# Patient Record
Sex: FEMALE | Race: BLACK OR AFRICAN AMERICAN | NOT HISPANIC OR LATINO | Employment: PART TIME | URBAN - METROPOLITAN AREA
[De-identification: names, ages, dates, MRNs, and addresses within clinical notes are randomized per-mention and may not be internally consistent; named-entity substitution may affect disease eponyms.]

---

## 2022-02-02 ENCOUNTER — APPOINTMENT (OUTPATIENT)
Dept: ULTRASOUND IMAGING | Facility: CLINIC | Age: 32
End: 2022-02-02
Attending: EMERGENCY MEDICINE
Payer: COMMERCIAL

## 2022-02-02 ENCOUNTER — HOSPITAL ENCOUNTER (EMERGENCY)
Facility: CLINIC | Age: 32
Discharge: HOME | End: 2022-02-02
Attending: EMERGENCY MEDICINE | Admitting: EMERGENCY MEDICINE
Payer: COMMERCIAL

## 2022-02-02 ENCOUNTER — APPOINTMENT (OUTPATIENT)
Dept: GENERAL RADIOLOGY | Facility: CLINIC | Age: 32
End: 2022-02-02
Attending: EMERGENCY MEDICINE
Payer: COMMERCIAL

## 2022-02-02 VITALS
DIASTOLIC BLOOD PRESSURE: 78 MMHG | RESPIRATION RATE: 20 BRPM | BODY MASS INDEX: 29.62 KG/M2 | HEART RATE: 103 BPM | SYSTOLIC BLOOD PRESSURE: 128 MMHG | OXYGEN SATURATION: 100 % | HEIGHT: 69 IN | WEIGHT: 200 LBS | TEMPERATURE: 98.8 F

## 2022-02-02 DIAGNOSIS — R22.41 LOCALIZED SWELLING OF RIGHT LOWER LEG: ICD-10-CM

## 2022-02-02 DIAGNOSIS — M25.471 RIGHT ANKLE SWELLING: Primary | ICD-10-CM

## 2022-02-02 PROCEDURE — 99285 EMERGENCY DEPT VISIT HI MDM: CPT | Mod: 25

## 2022-02-02 PROCEDURE — 99284 EMERGENCY DEPT VISIT MOD MDM: CPT | Mod: 25

## 2022-02-02 PROCEDURE — 93971 EXTREMITY STUDY: CPT | Mod: RT

## 2022-02-02 PROCEDURE — 73610 X-RAY EXAM OF ANKLE: CPT | Mod: RT

## 2022-02-02 RX ORDER — IBUPROFEN 600 MG/1
600 TABLET, FILM COATED ORAL EVERY 6 HOURS PRN
Qty: 60 TABLET | Refills: 0 | Status: SHIPPED | OUTPATIENT
Start: 2022-02-02

## 2022-02-02 ASSESSMENT — ENCOUNTER SYMPTOMS
FEVER: 0
ABDOMINAL PAIN: 0
SHORTNESS OF BREATH: 0

## 2022-02-02 ASSESSMENT — MIFFLIN-ST. JEOR: SCORE: 1676.57

## 2022-02-03 NOTE — ED TRIAGE NOTES
Pt presents for evaluation of right ankle pain and swelling since last Wednesday. Denies any injury. Denies any drainage. Pain with ambulation. Was seen at [facility] on Friday for same issue, was given antibiotics, but no improvement. Took tylenol around 1700.

## 2022-02-03 NOTE — ED PROVIDER NOTES
"  History   Chief Complaint:  Ankle Pain     HPI   Loren Gonzalez is a 32 year old female with history of prediabetes who presents with ankle pain. A week and a half ago, the patient developed pain and swelling over the lateral aspect of the right ankle. She developed additional swelling up her right leg. She did not experience any trauma around this time. Six days ago, she was seen for this pain and was discharged with Keflex following a diagnosis of cellulitis. She has been taking the Keflex as prescribed and Tylenol for pain. Since then her symptoms have been persistent. Due to her continued symptoms she presented here. She denies any chest pain, shortness of breath, abdominal pain, fever or right calf or knee pain.     Review of Systems   Constitutional: Negative for fever.   Respiratory: Negative for shortness of breath.    Cardiovascular: Positive for leg swelling. Negative for chest pain.   Gastrointestinal: Negative for abdominal pain.   Musculoskeletal:        Right ankle swelling and pain  No right calf pain  No right knee pain   All other systems reviewed and are negative.    Allergies:  The patient has no known allergies.     Medications:  Keflex    Past Medical History:    Prediabetes  Female infertility  LGSIL  Dysfunctional uterine hemorrhage  Acute blood loss anemia  Hypotension due to blood loss  Tension type headache  Vitamin D deficiency    Surgery History:  Female circumcision    Social History:  The patient was unaccompanied to the ED.    Physical Exam     Patient Vitals for the past 24 hrs:   BP Temp Temp src Pulse Resp SpO2 Height Weight   02/02/22 2043 128/78 98.8  F (37.1  C) Oral 103 20 100 % 1.753 m (5' 9\") 90.7 kg (200 lb)     Physical Exam  General: Alert, appears well-developed and well-nourished. Cooperative.     In mild distress  HEENT:  Head:  Atraumatic  Ears:  External ears are normal  Mouth/Throat:  Oropharynx is without erythema or exudate and mucous membranes are moist. "   Eyes:   Conjunctivae normal and EOM are normal. No scleral icterus.  CV:  Normal rate, regular rhythm, normal heart sounds and radial pulses are 2+ and symmetric.  Systolic murmur.  Resp:  Breath sounds are clear bilaterally    Non-labored, no retractions or accessory muscle use  GI:  Abdomen is soft, no distension, no tenderness. No rebound or guarding.  No CVA tenderness bilaterally  MS:  Normal range of motion. No edema.    Back atraumatic.    No midline cervical, thoracic, or lumbar tenderness    Left Leg:    Thigh:     Normal    Knee:     Normal; there is no effusion    Proximal fibula:   Normal and non-tender    Tibia:     Long shaft of the tibia is normal    Zoila-lateral malleolar ligaments: Swollen and tender    Medial collateral (deltoid) ligament: Normal    Lateral malleolus:   Mildly-tender    Medial Malleolus:   Non-tender      Achilles tendon:   Normal, intact    5th MT base:    Normal and non-tender    Foot bones:    Normal and non-tender       Capillary Refill:   Normal.    Sensation:    Foot and ankle sensation are normal    There is 1+ pitting edema to right lower extremity, distal to right knee.   Skin:  No rash or lesions noted, swelling is notes as above  Neuro: Speech is normal and fluent  Psych:  Awake. Alert.  Normal affect.  Appropriate interactions.    Emergency Department Course   Imaging:  XR Ankle Right G/E 3 Views   Final Result   IMPRESSION: No fracture or dislocation. No focal bone lesion. Lateral soft tissue swelling.      US Lower Extremity Venous Duplex Right   Final Result   IMPRESSION:   1.  No deep venous thrombosis in the right lower extremity.        Emergency Department Course:     Reviewed:  I reviewed nursing notes, vitals, past medical history and care everywhere    Assessments:  2051 I obtained history and examined the patient as noted above.     2228 I rechecked and updated the patient regarding the imaging results and the plan for care.    Disposition:  The patient was  discharged to home.     Impression & Plan   Medical Decision Making:  Loren Gonzalez is a 32 year old female who presents for evaluation of right leg and ankle swelling. A broad differential was considered including Baker's cyst rupture, Baker's cyst, hematoma, rupture, compartment syndrome, muscle rupture, DVT, superficial thrombophlebitis, compression of the venous structures higher up in the abdomen and or leg cellulitis/ abscess, etc. Ultrasound is negative. There are no signs of compartment syndrome or other worrisome etiologies at this point so outpatient management is indicated.  I doubt more central causes of their swelling like renal failure, chf, liver disease, etc. There is no erythema and patient recently completed a 7 day course of antibiotics for possible cellulitis, low concern for cellulitis or infectious process.  No fluctuance to swelling.  The swelling does not appear to be within the right ankle joint either, low concern for septic arthritis. They will elevate leg, do gentle dorsal flexion and plantar flexion motions, take Tylenol for pain, and avoid strenuous activity. They should followup with her primary care doctor and/or orthopedic surgery for re-evaluation due to chronic swelling of extremity.  Written copy of imaging data given directly to patient and questions answered.  Discharged home.    Diagnosis:    ICD-10-CM    1. Right ankle swelling  M25.471    2. Localized swelling of right lower leg  R22.41        Discharge Medications:  New Prescriptions    IBUPROFEN (ADVIL/MOTRIN) 600 MG TABLET    Take 1 tablet (600 mg) by mouth every 6 hours as needed for moderate pain       Scribe Disclosure:  I, Bulmaro Srinivasan, am serving as a scribe at 10:32 PM on 2/2/2022 to document services personally performed by Henry Fishman MD based on my observations and the provider's statements to me.     Henry Fishman MD  02/02/22 5875

## 2022-02-03 NOTE — ED NOTES
Musculoskeletal - RLE Weight-Bearing Status: weight-bearing as tolerated  CMS Intact: Yes  Musculoskeletal Comment: pt comes in with RLE swelling, pain and discoloration for last week. right leg is visiblly more swoolen then left.

## 2023-02-09 ENCOUNTER — HOSPITAL ENCOUNTER (EMERGENCY)
Facility: CLINIC | Age: 33
Discharge: HOME | End: 2023-02-10
Attending: EMERGENCY MEDICINE | Admitting: EMERGENCY MEDICINE
Payer: COMMERCIAL

## 2023-02-09 ENCOUNTER — APPOINTMENT (OUTPATIENT)
Dept: CT IMAGING | Facility: CLINIC | Age: 33
End: 2023-02-09
Attending: EMERGENCY MEDICINE
Payer: COMMERCIAL

## 2023-02-09 ENCOUNTER — APPOINTMENT (OUTPATIENT)
Dept: ULTRASOUND IMAGING | Facility: CLINIC | Age: 33
End: 2023-02-09
Attending: EMERGENCY MEDICINE
Payer: COMMERCIAL

## 2023-02-09 DIAGNOSIS — R11.2 NAUSEA AND VOMITING, UNSPECIFIED VOMITING TYPE: ICD-10-CM

## 2023-02-09 DIAGNOSIS — R10.11 ABDOMINAL PAIN, RIGHT UPPER QUADRANT: ICD-10-CM

## 2023-02-09 DIAGNOSIS — N13.4 HYDROURETER ON RIGHT: ICD-10-CM

## 2023-02-09 LAB
ALBUMIN SERPL BCG-MCNC: 4.3 G/DL (ref 3.5–5.2)
ALP SERPL-CCNC: 49 U/L (ref 35–104)
ALT SERPL W P-5'-P-CCNC: 14 U/L (ref 10–35)
ANION GAP SERPL CALCULATED.3IONS-SCNC: 11 MMOL/L (ref 7–15)
AST SERPL W P-5'-P-CCNC: 15 U/L (ref 10–35)
BASOPHILS # BLD AUTO: 0.1 10E3/UL (ref 0–0.2)
BASOPHILS NFR BLD AUTO: 1 %
BILIRUB SERPL-MCNC: 0.3 MG/DL
BUN SERPL-MCNC: 5.4 MG/DL (ref 6–20)
CALCIUM SERPL-MCNC: 9.6 MG/DL (ref 8.6–10)
CHLORIDE SERPL-SCNC: 103 MMOL/L (ref 98–107)
CREAT SERPL-MCNC: 0.49 MG/DL (ref 0.51–0.95)
DEPRECATED HCO3 PLAS-SCNC: 23 MMOL/L (ref 22–29)
EOSINOPHIL # BLD AUTO: 0.2 10E3/UL (ref 0–0.7)
EOSINOPHIL NFR BLD AUTO: 4 %
ERYTHROCYTE [DISTWIDTH] IN BLOOD BY AUTOMATED COUNT: 13.3 % (ref 10–15)
GFR SERPL CREATININE-BSD FRML MDRD: >90 ML/MIN/1.73M2
GLUCOSE SERPL-MCNC: 85 MG/DL (ref 70–99)
HCG SERPL QL: NEGATIVE
HCT VFR BLD AUTO: 40.5 % (ref 35–47)
HGB BLD-MCNC: 13.1 G/DL (ref 11.7–15.7)
HOLD SPECIMEN: NORMAL
IMM GRANULOCYTES # BLD: 0 10E3/UL
IMM GRANULOCYTES NFR BLD: 0 %
LIPASE SERPL-CCNC: 25 U/L (ref 13–60)
LYMPHOCYTES # BLD AUTO: 2.4 10E3/UL (ref 0.8–5.3)
LYMPHOCYTES NFR BLD AUTO: 42 %
MCH RBC QN AUTO: 28.3 PG (ref 26.5–33)
MCHC RBC AUTO-ENTMCNC: 32.3 G/DL (ref 31.5–36.5)
MCV RBC AUTO: 88 FL (ref 78–100)
MONOCYTES # BLD AUTO: 0.5 10E3/UL (ref 0–1.3)
MONOCYTES NFR BLD AUTO: 9 %
NEUTROPHILS # BLD AUTO: 2.5 10E3/UL (ref 1.6–8.3)
NEUTROPHILS NFR BLD AUTO: 44 %
NRBC # BLD AUTO: 0 10E3/UL
NRBC BLD AUTO-RTO: 0 /100
PLATELET # BLD AUTO: 257 10E3/UL (ref 150–450)
POTASSIUM SERPL-SCNC: 3.6 MMOL/L (ref 3.4–5.3)
PROT SERPL-MCNC: 6.9 G/DL (ref 6.4–8.3)
RBC # BLD AUTO: 4.63 10E6/UL (ref 3.8–5.2)
SODIUM SERPL-SCNC: 137 MMOL/L (ref 136–145)
WBC # BLD AUTO: 5.8 10E3/UL (ref 4–11)

## 2023-02-09 PROCEDURE — 84703 CHORIONIC GONADOTROPIN ASSAY: CPT | Performed by: EMERGENCY MEDICINE

## 2023-02-09 PROCEDURE — 250N000011 HC RX IP 250 OP 636: Performed by: EMERGENCY MEDICINE

## 2023-02-09 PROCEDURE — 76705 ECHO EXAM OF ABDOMEN: CPT

## 2023-02-09 PROCEDURE — 99285 EMERGENCY DEPT VISIT HI MDM: CPT | Mod: 25

## 2023-02-09 PROCEDURE — 250N000009 HC RX 250: Performed by: EMERGENCY MEDICINE

## 2023-02-09 PROCEDURE — 85025 COMPLETE CBC W/AUTO DIFF WBC: CPT | Performed by: EMERGENCY MEDICINE

## 2023-02-09 PROCEDURE — 80053 COMPREHEN METABOLIC PANEL: CPT | Performed by: EMERGENCY MEDICINE

## 2023-02-09 PROCEDURE — 96374 THER/PROPH/DIAG INJ IV PUSH: CPT | Mod: 59

## 2023-02-09 PROCEDURE — 83690 ASSAY OF LIPASE: CPT | Performed by: EMERGENCY MEDICINE

## 2023-02-09 PROCEDURE — 36415 COLL VENOUS BLD VENIPUNCTURE: CPT | Performed by: EMERGENCY MEDICINE

## 2023-02-09 PROCEDURE — 250N000013 HC RX MED GY IP 250 OP 250 PS 637: Performed by: EMERGENCY MEDICINE

## 2023-02-09 PROCEDURE — 74177 CT ABD & PELVIS W/CONTRAST: CPT

## 2023-02-09 RX ORDER — ONDANSETRON 2 MG/ML
4 INJECTION INTRAMUSCULAR; INTRAVENOUS ONCE
Status: COMPLETED | OUTPATIENT
Start: 2023-02-09 | End: 2023-02-09

## 2023-02-09 RX ORDER — IOPAMIDOL 755 MG/ML
500 INJECTION, SOLUTION INTRAVASCULAR ONCE
Status: COMPLETED | OUTPATIENT
Start: 2023-02-09 | End: 2023-02-09

## 2023-02-09 RX ADMIN — ONDANSETRON 4 MG: 2 INJECTION INTRAMUSCULAR; INTRAVENOUS at 22:02

## 2023-02-09 RX ADMIN — ALUMINUM HYDROXIDE, MAGNESIUM HYDROXIDE, AND SIMETHICONE 30 ML: 200; 200; 20 SUSPENSION ORAL at 21:57

## 2023-02-09 RX ADMIN — IOPAMIDOL 100 ML: 755 INJECTION, SOLUTION INTRAVENOUS at 23:40

## 2023-02-09 ASSESSMENT — ENCOUNTER SYMPTOMS
ABDOMINAL PAIN: 1
VOMITING: 1

## 2023-02-09 ASSESSMENT — ACTIVITIES OF DAILY LIVING (ADL): ADLS_ACUITY_SCORE: 35

## 2023-02-09 NOTE — LETTER
[facility] EMERGENCY DEPT  [address]  [facility] [address]  [phone]      February 10, 2023    Loren Gonzalez  [address]   [facility] [address]  [phone] (home)     : 1990      To Whom it may concern:    Loren Gonzalez was seen in our Emergency Department today, -10, 2023.  Findings included right sided hydronephrosis and hydroureter for which an outpatient hematuria protocol CT scan was recommended.  There was no hematuria or infection in the urine.    Sincerely,      Angie Rico MD

## 2023-02-10 VITALS
DIASTOLIC BLOOD PRESSURE: 72 MMHG | OXYGEN SATURATION: 100 % | SYSTOLIC BLOOD PRESSURE: 114 MMHG | TEMPERATURE: 98.5 F | HEART RATE: 61 BPM | RESPIRATION RATE: 18 BRPM

## 2023-02-10 LAB
ALBUMIN UR-MCNC: NEGATIVE MG/DL
APPEARANCE UR: CLEAR
BILIRUB UR QL STRIP: NEGATIVE
COLOR UR AUTO: ABNORMAL
GLUCOSE UR STRIP-MCNC: NEGATIVE MG/DL
HGB UR QL STRIP: NEGATIVE
KETONES UR STRIP-MCNC: 20 MG/DL
LEUKOCYTE ESTERASE UR QL STRIP: NEGATIVE
NITRATE UR QL: NEGATIVE
PH UR STRIP: 5 [PH] (ref 5–7)
RBC URINE: <1 /HPF
SP GR UR STRIP: 1.01 (ref 1–1.03)
SQUAMOUS EPITHELIAL: 3 /HPF
UROBILINOGEN UR STRIP-MCNC: NORMAL MG/DL
WBC URINE: 1 /HPF

## 2023-02-10 PROCEDURE — 81001 URINALYSIS AUTO W/SCOPE: CPT | Performed by: EMERGENCY MEDICINE

## 2023-02-10 RX ORDER — SUCRALFATE 1 G/1
1 TABLET ORAL 4 TIMES DAILY PRN
Qty: 20 TABLET | Refills: 0 | Status: SHIPPED | OUTPATIENT
Start: 2023-02-10

## 2023-02-10 RX ORDER — ONDANSETRON 4 MG/1
4 TABLET, ORALLY DISINTEGRATING ORAL EVERY 6 HOURS PRN
Qty: 15 TABLET | Refills: 0 | Status: SHIPPED | OUTPATIENT
Start: 2023-02-10

## 2023-02-10 ASSESSMENT — ACTIVITIES OF DAILY LIVING (ADL): ADLS_ACUITY_SCORE: 35

## 2023-02-10 NOTE — ED NOTES
Rapid Assessment Note    History:   Loren Gonzalez is a 33 year old female who presents with abdominal pain. She says that this pain is centralized radiating towards the right side, noting that it is worst in the RUQ and more minor in the RLQ. The patient reports that she has been vomiting for three days however today was the worst. She took tylenol yesterday, none today. Denies history of abdominal surgeries. She says that her symptoms are worse after eating.  Family is helping interpret.    Exam:   General:  Alert, interactive  Cardiovascular:  Well perfused  Lungs:  No respiratory distress, no accessory muscle use  Neuro:  Moving all 4 extremities  GI: Pronounced RUQ tenderness, mild RUQ tenderness  Skin:  Warm, dry  Psych:  Normal affect    Plan of Care:   I evaluated the patient and developed an initial plan of care. I discussed this plan and explained that I, or one of my partners, would be returning to complete the evaluation.     Labs reviewed.  Start with US.  If negative, re-evaluate for possible CT.    I, Sandro Gates, am serving as a scribe to document services personally performed by Angie Rico MD, based on my observations and the provider's statements to me.    2/9/2023  [employer]    Portions of this medical record were completed by a scribe. UPON MY REVIEW AND AUTHENTICATION BY ELECTRONIC SIGNATURE, this confirms (a) I performed the applicable clinical services, and (b) the record is accurate.        Angie Rico MD  02/09/23 [id number]

## 2023-02-10 NOTE — ED TRIAGE NOTES
Here for concern of epigastric and right sided abdominal pain started 3 days ago associated with n/v. ABCs intact.      Triage Assessment     Row Name 02/09/23 2025       Triage Assessment (Adult)    Airway WDL WDL       Respiratory WDL    Respiratory WDL WDL       Cardiac WDL    Cardiac WDL WDL

## 2023-02-10 NOTE — ED PROVIDER NOTES
History     Chief Complaint:  Abdominal Pain and Nausea & Vomiting       HPI   Loren Gonzalez is a 33 year old female who presents with abdominal pain. She says that this pain is centralized radiating towards the right side, noting that it is worst in the RUQ and more minor in the RLQ. The patient reports that she has been vomiting for three days however today was the worst. She took tylenol yesterday, none today. Denies history of abdominal surgeries. She says that her symptoms are worse after eating.  Family is helping interpret.  Formal  used later confirming the same.    Independent Historian:   Patient's family member in room provided interpretation.     Review of External Notes: NA     ROS:  Review of Systems   Gastrointestinal: Positive for abdominal pain and vomiting.       Allergies:  No Known Allergies     Medications:    Esomeprazole  Indomethacin     Past Medical History:    Infection ureaplasma  Dysmenorrhea  High risk human papillomavirus test positive  Heart murmur  Hyperprolactinemia  Prediabetes  Tinea corporis    Social History:  Presents to the ED with a family member.     Physical Exam     Patient Vitals for the past 24 hrs:   BP Temp Temp src Pulse Resp SpO2   02/09/23 2247 -- -- -- -- -- 99 %   02/09/23 2246 117/61 -- -- 61 -- --   02/09/23 2027 114/50 98.5  F (36.9  C) Temporal 70 18 100 %      Physical Exam  General:  Alert, interactive  Cardiovascular:  Well perfused, RRR  Lungs:  No respiratory distress, no accessory muscle use, CTAB  Neuro:  Moving all 4 extremities  GI: Pronounced RUQ tenderness, mild RLQ tenderness  Skin:  Warm, dry  Psych:  Normal affect    Emergency Department Course     Imaging:  CT Abdomen Pelvis w Contrast   Final Result   IMPRESSION:    1.  Slight asymmetric fullness of the right renal pelvis and right intrarenal collecting system relative to the left may indicate minimal or low-grade right hydronephrosis. However, no definite obstructing process  is seen. No ureteral stones. Findings    could be seen with a recently passed stone from the right although nonspecific. Correlation for any signs of infection. Suggest short-term follow-up CT using the hematuria protocol for better evaluation of the right renal collecting system and ureter and    to exclude any developing or worsening obstruction.      2.  Otherwise, no acute abnormalities or CT findings elsewhere to explain abdominal pain.      3.  Appendix is normal.      4.  Trace amount of free fluid in the pelvis is nonspecific but probably physiologic in nature.      US Abdomen Limited (RUQ)   Final Result   IMPRESSION:   1.  Mild fatty infiltration of the liver.      2.  Otherwise negative right upper quadrant ultrasound.               Report per radiology    Laboratory:  Labs Ordered and Resulted from Time of ED Arrival to Time of ED Departure   COMPREHENSIVE METABOLIC PANEL - Abnormal       Result Value    Sodium 137      Potassium 3.6      Chloride 103      Carbon Dioxide (CO2) 23      Anion Gap 11      Urea Nitrogen 5.4 (*)     Creatinine 0.49 (*)     Calcium 9.6      Glucose 85      Alkaline Phosphatase 49      AST 15      ALT 14      Protein Total 6.9      Albumin 4.3      Bilirubin Total 0.3      GFR Estimate >90     ROUTINE UA WITH MICROSCOPIC REFLEX TO CULTURE - Abnormal    Color Urine Straw      Appearance Urine Clear      Glucose Urine Negative      Bilirubin Urine Negative      Ketones Urine 20 (*)     Specific Gravity Urine 1.010      Blood Urine Negative      pH Urine 5.0      Protein Albumin Urine Negative      Urobilinogen Urine Normal      Nitrite Urine Negative      Leukocyte Esterase Urine Negative      RBC Urine <1      WBC Urine 1      Squamous Epithelials Urine 3 (*)    LIPASE - Normal    Lipase 25     HCG QUALITATIVE PREGNANCY - Normal    hCG Serum Qualitative Negative     CBC WITH PLATELETS AND DIFFERENTIAL    WBC Count 5.8      RBC Count 4.63      Hemoglobin 13.1      Hematocrit 40.5       MCV 88      MCH 28.3      MCHC 32.3      RDW 13.3      Platelet Count 257      % Neutrophils 44      % Lymphocytes 42      % Monocytes 9      % Eosinophils 4      % Basophils 1      % Immature Granulocytes 0      NRBCs per 100 WBC 0      Absolute Neutrophils 2.5      Absolute Lymphocytes 2.4      Absolute Monocytes 0.5      Absolute Eosinophils 0.2      Absolute Basophils 0.1      Absolute Immature Granulocytes 0.0      Absolute NRBCs 0.0        Emergency Department Course & Assessments:    Interventions:  Medications   ondansetron (ZOFRAN) injection 4 mg (4 mg Intravenous Given 2/9/23 2202)   lidocaine (viscous) (XYLOCAINE) 2 % 15 mL, alum & mag hydroxide-simethicone (MAALOX) 15 mL GI Cocktail (30 mLs Oral Given 2/9/23 2157)   iopamidol (ISOVUE-370) solution 500 mL (100 mLs Intravenous Given 2/9/23 2340)   sodium chloride (PF) 0.9% PF flush 100 mL (65 mLs Intravenous Given 2/9/23 2348)      Independent Interpretation (X-rays, CTs, rhythm strip):  CT reviewed, no stone seen     Consultations/Discussion of Management or Tests:  NA        Social Determinants of Health affecting care:   None    Assessments:  2138 I examined the patient and obtained history as noted above.    2306 I rechecked the patient and updated her on CT findings.     Disposition:  The patient was discharged to home.     Impression & Plan      Labs and ultrasound do not support biliary colic, cholecystitis, hepatitis, pancreatitis as an etiology of her symptoms.  She has some associated right lower quadrant tenderness as well and work-up was expanded with CT.  CT findings were noted and discussed with her.  Urinalysis was obtained afterwards with no evidence of infection and no hematuria that could have further supported a recently passed stone.  I am unclear if the CT findings fully explain her pain is typically if there was ureteral colic it would be felt more posteriorly than in the anterior abdomen.  Outpatient follow-up for further  imaging was recommended.  At this time gastritis and peptic ulcer disease also remains on the differential.      Diagnosis:    ICD-10-CM    1. Nausea and vomiting, unspecified vomiting type  R11.2       2. Abdominal pain, right upper quadrant  R10.11       3. Hydroureter on right  N13.4            Discharge Medications:  Discharge Medication List as of 2/10/2023  1:16 AM      START taking these medications    Details   omeprazole (PRILOSEC) 20 MG DR capsule Take 1 capsule (20 mg) by mouth daily for 14 days, Disp-14 capsule, R-0, E-Prescribe      ondansetron (ZOFRAN ODT) 4 MG ODT tab Take 1 tablet (4 mg) by mouth every 6 hours as needed for nausea or vomiting, Disp-15 tablet, R-0, E-Prescribe      sucralfate (CARAFATE) 1 GM tablet Take 1 tablet (1 g) by mouth 4 times daily as needed (nausea or abdominal pain), Disp-20 tablet, R-0, E-Prescribe              Scribe Disclosure:  I, Sandro Gates, am serving as a scribe at 11:05 PM on 2/9/2023 to document services personally performed by Angie Rico MD based on my observations and the provider's statements to me.     2/9/2023   Angie Rico MD Gosen, Christine Leigh, MD  02/12/23 [id number]